# Patient Record
Sex: FEMALE | Race: BLACK OR AFRICAN AMERICAN | NOT HISPANIC OR LATINO | URBAN - METROPOLITAN AREA
[De-identification: names, ages, dates, MRNs, and addresses within clinical notes are randomized per-mention and may not be internally consistent; named-entity substitution may affect disease eponyms.]

---

## 2018-01-28 ENCOUNTER — INPATIENT (INPATIENT)
Facility: HOSPITAL | Age: 63
LOS: 2 days | Discharge: HOME | End: 2018-01-31
Attending: INTERNAL MEDICINE | Admitting: INTERNAL MEDICINE
Payer: MEDICAID

## 2018-01-28 VITALS
TEMPERATURE: 99 F | WEIGHT: 162.04 LBS | DIASTOLIC BLOOD PRESSURE: 68 MMHG | SYSTOLIC BLOOD PRESSURE: 105 MMHG | RESPIRATION RATE: 16 BRPM | HEIGHT: 64 IN | HEART RATE: 68 BPM | OXYGEN SATURATION: 100 %

## 2018-01-28 DIAGNOSIS — E78.00 PURE HYPERCHOLESTEROLEMIA, UNSPECIFIED: ICD-10-CM

## 2018-01-28 DIAGNOSIS — F31.9 BIPOLAR DISORDER, UNSPECIFIED: ICD-10-CM

## 2018-01-28 DIAGNOSIS — I10 ESSENTIAL (PRIMARY) HYPERTENSION: ICD-10-CM

## 2018-01-28 DIAGNOSIS — J10.1 INFLUENZA DUE TO OTHER IDENTIFIED INFLUENZA VIRUS WITH OTHER RESPIRATORY MANIFESTATIONS: ICD-10-CM

## 2018-01-28 DIAGNOSIS — R55 SYNCOPE AND COLLAPSE: ICD-10-CM

## 2018-01-28 LAB
ALBUMIN SERPL ELPH-MCNC: 3.4 G/DL — SIGNIFICANT CHANGE UP (ref 3.3–5)
ALP SERPL-CCNC: 102 U/L — SIGNIFICANT CHANGE UP (ref 40–120)
ALT FLD-CCNC: 21 U/L — SIGNIFICANT CHANGE UP (ref 12–78)
ANION GAP SERPL CALC-SCNC: 8 MMOL/L — SIGNIFICANT CHANGE UP (ref 5–17)
AST SERPL-CCNC: 26 U/L — SIGNIFICANT CHANGE UP (ref 15–37)
BASOPHILS # BLD AUTO: 0 K/UL — SIGNIFICANT CHANGE UP (ref 0–0.2)
BASOPHILS NFR BLD AUTO: 0 % — SIGNIFICANT CHANGE UP (ref 0–2)
BILIRUB SERPL-MCNC: 0.2 MG/DL — SIGNIFICANT CHANGE UP (ref 0.2–1.2)
BUN SERPL-MCNC: 17 MG/DL — SIGNIFICANT CHANGE UP (ref 7–23)
CALCIUM SERPL-MCNC: 8.6 MG/DL — SIGNIFICANT CHANGE UP (ref 8.5–10.1)
CHLORIDE SERPL-SCNC: 98 MMOL/L — SIGNIFICANT CHANGE UP (ref 96–108)
CK MB CFR SERPL CALC: <0.5 NG/ML — SIGNIFICANT CHANGE UP (ref 0.5–3.6)
CO2 SERPL-SCNC: 31 MMOL/L — SIGNIFICANT CHANGE UP (ref 22–31)
CREAT SERPL-MCNC: 1.07 MG/DL — SIGNIFICANT CHANGE UP (ref 0.5–1.3)
EOSINOPHIL # BLD AUTO: 0 K/UL — SIGNIFICANT CHANGE UP (ref 0–0.5)
EOSINOPHIL NFR BLD AUTO: 0 % — SIGNIFICANT CHANGE UP (ref 0–6)
FLUAV SPEC QL CULT: POSITIVE
FLUBV AG SPEC QL IA: NEGATIVE — SIGNIFICANT CHANGE UP
GLUCOSE SERPL-MCNC: 113 MG/DL — HIGH (ref 70–99)
HCT VFR BLD CALC: 33.3 % — LOW (ref 34.5–45)
HGB BLD-MCNC: 11 G/DL — LOW (ref 11.5–15.5)
IMM GRANULOCYTES NFR BLD AUTO: 0.2 % — SIGNIFICANT CHANGE UP (ref 0–1.5)
LACTATE SERPL-SCNC: 1.3 MMOL/L — SIGNIFICANT CHANGE UP (ref 0.7–2)
LYMPHOCYTES # BLD AUTO: 0.67 K/UL — LOW (ref 1–3.3)
LYMPHOCYTES # BLD AUTO: 13.6 % — SIGNIFICANT CHANGE UP (ref 13–44)
MAGNESIUM SERPL-MCNC: 2.5 MG/DL — SIGNIFICANT CHANGE UP (ref 1.6–2.6)
MCHC RBC-ENTMCNC: 28.9 PG — SIGNIFICANT CHANGE UP (ref 27–34)
MCHC RBC-ENTMCNC: 33 GM/DL — SIGNIFICANT CHANGE UP (ref 32–36)
MCV RBC AUTO: 87.6 FL — SIGNIFICANT CHANGE UP (ref 80–100)
MONOCYTES # BLD AUTO: 0.52 K/UL — SIGNIFICANT CHANGE UP (ref 0–0.9)
MONOCYTES NFR BLD AUTO: 10.5 % — SIGNIFICANT CHANGE UP (ref 2–14)
NEUTROPHILS # BLD AUTO: 3.73 K/UL — SIGNIFICANT CHANGE UP (ref 1.8–7.4)
NEUTROPHILS NFR BLD AUTO: 75.7 % — SIGNIFICANT CHANGE UP (ref 43–77)
NRBC # BLD: 0 /100 WBCS — SIGNIFICANT CHANGE UP (ref 0–0)
PLATELET # BLD AUTO: 179 K/UL — SIGNIFICANT CHANGE UP (ref 150–400)
POTASSIUM SERPL-MCNC: 3.5 MMOL/L — SIGNIFICANT CHANGE UP (ref 3.5–5.3)
POTASSIUM SERPL-SCNC: 3.5 MMOL/L — SIGNIFICANT CHANGE UP (ref 3.5–5.3)
PROT SERPL-MCNC: 8.2 GM/DL — SIGNIFICANT CHANGE UP (ref 6–8.3)
RBC # BLD: 3.8 M/UL — SIGNIFICANT CHANGE UP (ref 3.8–5.2)
RBC # FLD: 12.7 % — SIGNIFICANT CHANGE UP (ref 10.3–14.5)
SODIUM SERPL-SCNC: 137 MMOL/L — SIGNIFICANT CHANGE UP (ref 135–145)
TROPONIN I SERPL-MCNC: <.015 NG/ML — SIGNIFICANT CHANGE UP (ref 0.01–0.04)
WBC # BLD: 4.93 K/UL — SIGNIFICANT CHANGE UP (ref 3.8–10.5)
WBC # FLD AUTO: 4.93 K/UL — SIGNIFICANT CHANGE UP (ref 3.8–10.5)

## 2018-01-28 PROCEDURE — 93010 ELECTROCARDIOGRAM REPORT: CPT

## 2018-01-28 PROCEDURE — 70450 CT HEAD/BRAIN W/O DYE: CPT | Mod: 26

## 2018-01-28 PROCEDURE — 71045 X-RAY EXAM CHEST 1 VIEW: CPT | Mod: 26

## 2018-01-28 PROCEDURE — 99285 EMERGENCY DEPT VISIT HI MDM: CPT

## 2018-01-28 RX ORDER — ONDANSETRON 8 MG/1
4 TABLET, FILM COATED ORAL EVERY 6 HOURS
Qty: 0 | Refills: 0 | Status: DISCONTINUED | OUTPATIENT
Start: 2018-01-28 | End: 2018-01-31

## 2018-01-28 RX ORDER — TRIFLUOPERAZINE HCL 5 MG
1 TABLET ORAL
Qty: 0 | Refills: 0 | COMMUNITY

## 2018-01-28 RX ORDER — ATENOLOL 25 MG/1
1 TABLET ORAL
Qty: 0 | Refills: 0 | COMMUNITY

## 2018-01-28 RX ORDER — CARBAMAZEPINE 200 MG
1 TABLET ORAL
Qty: 0 | Refills: 0 | COMMUNITY

## 2018-01-28 RX ORDER — ONDANSETRON 8 MG/1
4 TABLET, FILM COATED ORAL EVERY 6 HOURS
Qty: 0 | Refills: 0 | Status: DISCONTINUED | OUTPATIENT
Start: 2018-01-28 | End: 2018-01-28

## 2018-01-28 RX ORDER — DIPHENHYDRAMINE HCL 50 MG
25 CAPSULE ORAL ONCE
Qty: 0 | Refills: 0 | Status: COMPLETED | OUTPATIENT
Start: 2018-01-28 | End: 2018-01-28

## 2018-01-28 RX ORDER — CARBAMAZEPINE 200 MG
200 TABLET ORAL
Qty: 0 | Refills: 0 | Status: DISCONTINUED | OUTPATIENT
Start: 2018-01-28 | End: 2018-01-31

## 2018-01-28 RX ORDER — SIMVASTATIN 20 MG/1
1 TABLET, FILM COATED ORAL
Qty: 0 | Refills: 0 | COMMUNITY

## 2018-01-28 RX ORDER — ACETAMINOPHEN 500 MG
975 TABLET ORAL ONCE
Qty: 0 | Refills: 0 | Status: COMPLETED | OUTPATIENT
Start: 2018-01-28 | End: 2018-01-28

## 2018-01-28 RX ORDER — BENDROFLUMETHIAZIDE
0 POWDER (GRAM) MISCELLANEOUS
Qty: 0 | Refills: 0 | COMMUNITY

## 2018-01-28 RX ORDER — SIMVASTATIN 20 MG/1
40 TABLET, FILM COATED ORAL AT BEDTIME
Qty: 0 | Refills: 0 | Status: DISCONTINUED | OUTPATIENT
Start: 2018-01-28 | End: 2018-01-31

## 2018-01-28 RX ORDER — SODIUM CHLORIDE 9 MG/ML
2000 INJECTION INTRAMUSCULAR; INTRAVENOUS; SUBCUTANEOUS ONCE
Qty: 0 | Refills: 0 | Status: COMPLETED | OUTPATIENT
Start: 2018-01-28 | End: 2018-01-28

## 2018-01-28 RX ORDER — ATENOLOL 25 MG/1
50 TABLET ORAL DAILY
Qty: 0 | Refills: 0 | Status: DISCONTINUED | OUTPATIENT
Start: 2018-01-28 | End: 2018-01-31

## 2018-01-28 RX ORDER — METOCLOPRAMIDE HCL 10 MG
10 TABLET ORAL ONCE
Qty: 0 | Refills: 0 | Status: COMPLETED | OUTPATIENT
Start: 2018-01-28 | End: 2018-01-28

## 2018-01-28 RX ORDER — ENOXAPARIN SODIUM 100 MG/ML
40 INJECTION SUBCUTANEOUS DAILY
Qty: 0 | Refills: 0 | Status: DISCONTINUED | OUTPATIENT
Start: 2018-01-28 | End: 2018-01-31

## 2018-01-28 RX ORDER — SODIUM CHLORIDE 9 MG/ML
1000 INJECTION, SOLUTION INTRAVENOUS
Qty: 0 | Refills: 0 | Status: DISCONTINUED | OUTPATIENT
Start: 2018-01-28 | End: 2018-01-31

## 2018-01-28 RX ADMIN — Medication 10 MILLIGRAM(S): at 17:04

## 2018-01-28 RX ADMIN — SODIUM CHLORIDE 2000 MILLILITER(S): 9 INJECTION INTRAMUSCULAR; INTRAVENOUS; SUBCUTANEOUS at 17:05

## 2018-01-28 RX ADMIN — Medication 975 MILLIGRAM(S): at 17:05

## 2018-01-28 RX ADMIN — Medication 10 MILLIGRAM(S): at 23:21

## 2018-01-28 RX ADMIN — Medication 75 MILLIGRAM(S): at 17:59

## 2018-01-28 RX ADMIN — ATENOLOL 50 MILLIGRAM(S): 25 TABLET ORAL at 23:21

## 2018-01-28 RX ADMIN — Medication 25 MILLIGRAM(S): at 17:05

## 2018-01-28 RX ADMIN — SODIUM CHLORIDE 100 MILLILITER(S): 9 INJECTION, SOLUTION INTRAVENOUS at 23:20

## 2018-01-28 RX ADMIN — ENOXAPARIN SODIUM 40 MILLIGRAM(S): 100 INJECTION SUBCUTANEOUS at 23:20

## 2018-01-28 RX ADMIN — Medication 200 MILLIGRAM(S): at 23:21

## 2018-01-28 RX ADMIN — Medication 75 MILLIGRAM(S): at 23:21

## 2018-01-28 RX ADMIN — SIMVASTATIN 40 MILLIGRAM(S): 20 TABLET, FILM COATED ORAL at 23:22

## 2018-01-28 NOTE — ED PROVIDER NOTE - OBJECTIVE STATEMENT
Pertinent PMH/PSH/FHx/SHx and Review of Systems contained within:  62F hx of bipolar disorder on tegretol (and so perpetually very reserved according to cousin), htn, hld, and frontal headaches pw 3 d ays headache, cough, congestion, and fatigue. patient was in the bathroom today and had a syncopal episode. patient does not recall this. she notes the pain in her head was gradual onset and frontal. no neck pain, photophobia or confusion  Fh and Sh not otherwise contributory  ROS otherwise negative

## 2018-01-28 NOTE — ED PROVIDER NOTE - MEDICAL DECISION MAKING DETAILS
patient pw syncope in the context of viral syndrome like symptoms. will rule out for the flu and give fluids and rule out acs. As interpreted by ED physician, ECG is NSR with normal intervals/axis, no changes in QRS, no ST/T changes. patient pw syncope in the context of viral syndrome like symptoms. will rule out for the flu and give fluids and rule out acs. As interpreted by ED physician, ECG is NSR with normal intervals/axis, no changes in QRS, no ST/T changes. patient likely had syncope from severe dehydration 2ndary to the flu. will start tamiflu and admit to rehydration.

## 2018-01-28 NOTE — ED PROVIDER NOTE - PHYSICAL EXAMINATION
Gen: Alert but very quiet and slow to answer (reportedly her baseline), NAD  Head: NC, AT   Eyes: PERRL, EOMI, normal lids/conjunctiva  ENT: normal hearing, patent oropharynx without erythema/exudate, uvula midline  Neck: supple, no tenderness, Trachea midline  Pulm: Bilateral BS, normal resp effort, no wheeze/stridor/retractions  CV: RRR, no M/R/G, 2+ radial and dp pulses bl, no edema  Abd: soft, NT/ND, +BS, no hepatosplenomegaly  Mskel: extremities x4 with normal ROM and no joint effusions. no ctl spine ttp.   Skin: no rash, no bruising   Neuro: AAOx3, no sensory/motor deficits, CN 2-12 intact

## 2018-01-28 NOTE — H&P ADULT - HISTORY OF PRESENT ILLNESS
patient  reports  fever  cough  vomiting  for  past  2  days  evaluated  in  ER  found  to  have influenza A  admitted  for  furer w/u  and  treatment

## 2018-01-28 NOTE — ED PROVIDER NOTE - CARE PLAN
Principal Discharge DX:	Syncope, unspecified syncope type Principal Discharge DX:	Syncope, unspecified syncope type  Secondary Diagnosis:	Influenza A

## 2018-01-28 NOTE — H&P ADULT - NSHPLABSRESULTS_GEN_ALL_CORE
LABS:                        11.0   4.93  )-----------( 179      ( 28 Jan 2018 17:14 )             33.3     01-28    137  |  98  |  17  ----------------------------<  113<H>  3.5   |  31  |  1.07    Ca    8.6      28 Jan 2018 17:14  Mg     2.5     01-28    TPro  8.2  /  Alb  3.4  /  TBili  0.2  /  DBili  x   /  AinflueST  26  /  ALT  21  /  AlkPhos  102  01-28  Influenza A Rapid Screen: Positive: Interpretation of Influenza virus Type A - Antigen Enzyme Immunoassay:  This test screens for Influenza A.  A negative result does not eliminate  the possibility of infection with influenza A.  Depending upon the type  and adequacy of the specimen collected, the specificity of the assay  ranges from % and the sensitivity from 20-70%. (01.28.18 @ 17:14)

## 2018-01-28 NOTE — H&P ADULT - NSHPPHYSICALEXAM_GEN_ALL_CORE
PHYSICAL EXAM:    GENERAL: NAD, well-groomed, well-developed  HEAD:  Atraumatic, Normocephalic  EYES: EOMI, PERRLA, conjunctiva and sclera clear  ENMT: No tonsillar erythema, exudates, or enlargement; Moist mucous membranes, , No lesions  NECK: Supple, No JVD, Normal thyroid  NERVOUS SYSTEM:  Alert & Oriented X4, ; Motor Strength 5/5 B/L upper and lower extremities; DTRs 2+ intact and symmetric  CHEST/LUNG: Clear  bilaterally; No rales, rhonchi, wheezing, or rubs  HEART: Regular rate and rhythm; No murmurs, rubs, or gallops  ABDOMEN: Soft, Nontender, Nondistended; no  masses Bowel sounds present  EXTREMITIES:  + Peripheral Pulses, No clubbing, cyanosis, or edema  LYMPH: No lymphadenopathy noted   RECTAL: deferred  SKIN  no  reshes

## 2018-01-28 NOTE — ED ADULT NURSE NOTE - OBJECTIVE STATEMENT
patient stated that she passed about 1 hour ago, she was eating, having lunch and felt like she was about to vomit and then she passed out.  As per cousin, patient passed out for about a minute or two.  Patient reports headache, weakness,  3 episodes of vomiting for today, patient also reports abdominal pain, denies diarrhea, last BM today.
26-Aug-2017 00:53

## 2018-01-29 LAB
ALBUMIN SERPL ELPH-MCNC: 3.2 G/DL — LOW (ref 3.3–5)
ALP SERPL-CCNC: 93 U/L — SIGNIFICANT CHANGE UP (ref 40–120)
ALT FLD-CCNC: 21 U/L — SIGNIFICANT CHANGE UP (ref 12–78)
ANION GAP SERPL CALC-SCNC: 10 MMOL/L — SIGNIFICANT CHANGE UP (ref 5–17)
AST SERPL-CCNC: 26 U/L — SIGNIFICANT CHANGE UP (ref 15–37)
BASOPHILS # BLD AUTO: 0.01 K/UL — SIGNIFICANT CHANGE UP (ref 0–0.2)
BASOPHILS NFR BLD AUTO: 0.2 % — SIGNIFICANT CHANGE UP (ref 0–2)
BILIRUB SERPL-MCNC: 0.3 MG/DL — SIGNIFICANT CHANGE UP (ref 0.2–1.2)
BUN SERPL-MCNC: 11 MG/DL — SIGNIFICANT CHANGE UP (ref 7–23)
CALCIUM SERPL-MCNC: 7.9 MG/DL — LOW (ref 8.5–10.1)
CHLORIDE SERPL-SCNC: 97 MMOL/L — SIGNIFICANT CHANGE UP (ref 96–108)
CO2 SERPL-SCNC: 28 MMOL/L — SIGNIFICANT CHANGE UP (ref 22–31)
CREAT SERPL-MCNC: 0.76 MG/DL — SIGNIFICANT CHANGE UP (ref 0.5–1.3)
EOSINOPHIL # BLD AUTO: 0 K/UL — SIGNIFICANT CHANGE UP (ref 0–0.5)
EOSINOPHIL NFR BLD AUTO: 0 % — SIGNIFICANT CHANGE UP (ref 0–6)
FLUAV H3 RNA SPEC QL NAA+PROBE: DETECTED
GLUCOSE SERPL-MCNC: 112 MG/DL — HIGH (ref 70–99)
HCT VFR BLD CALC: 31.7 % — LOW (ref 34.5–45)
HCV AB S/CO SERPL IA: 0.16 S/CO — SIGNIFICANT CHANGE UP
HCV AB SERPL-IMP: SIGNIFICANT CHANGE UP
HGB BLD-MCNC: 10.6 G/DL — LOW (ref 11.5–15.5)
HIV 1+2 AB+HIV1 P24 AG SERPL QL IA: SIGNIFICANT CHANGE UP
IMM GRANULOCYTES NFR BLD AUTO: 0.2 % — SIGNIFICANT CHANGE UP (ref 0–1.5)
LYMPHOCYTES # BLD AUTO: 1.13 K/UL — SIGNIFICANT CHANGE UP (ref 1–3.3)
LYMPHOCYTES # BLD AUTO: 23 % — SIGNIFICANT CHANGE UP (ref 13–44)
MCHC RBC-ENTMCNC: 29.1 PG — SIGNIFICANT CHANGE UP (ref 27–34)
MCHC RBC-ENTMCNC: 33.4 GM/DL — SIGNIFICANT CHANGE UP (ref 32–36)
MCV RBC AUTO: 87.1 FL — SIGNIFICANT CHANGE UP (ref 80–100)
MONOCYTES # BLD AUTO: 0.37 K/UL — SIGNIFICANT CHANGE UP (ref 0–0.9)
MONOCYTES NFR BLD AUTO: 7.5 % — SIGNIFICANT CHANGE UP (ref 2–14)
NEUTROPHILS # BLD AUTO: 3.4 K/UL — SIGNIFICANT CHANGE UP (ref 1.8–7.4)
NEUTROPHILS NFR BLD AUTO: 69.1 % — SIGNIFICANT CHANGE UP (ref 43–77)
PLATELET # BLD AUTO: 160 K/UL — SIGNIFICANT CHANGE UP (ref 150–400)
POTASSIUM SERPL-MCNC: 3.4 MMOL/L — LOW (ref 3.5–5.3)
POTASSIUM SERPL-SCNC: 3.4 MMOL/L — LOW (ref 3.5–5.3)
PROT SERPL-MCNC: 7.7 GM/DL — SIGNIFICANT CHANGE UP (ref 6–8.3)
RAPID RVP RESULT: DETECTED
RBC # BLD: 3.64 M/UL — LOW (ref 3.8–5.2)
RBC # FLD: 12.4 % — SIGNIFICANT CHANGE UP (ref 10.3–14.5)
SODIUM SERPL-SCNC: 135 MMOL/L — SIGNIFICANT CHANGE UP (ref 135–145)
WBC # BLD: 4.92 K/UL — SIGNIFICANT CHANGE UP (ref 3.8–10.5)
WBC # FLD AUTO: 4.92 K/UL — SIGNIFICANT CHANGE UP (ref 3.8–10.5)

## 2018-01-29 RX ORDER — ACETAMINOPHEN 500 MG
650 TABLET ORAL EVERY 6 HOURS
Qty: 0 | Refills: 0 | Status: DISCONTINUED | OUTPATIENT
Start: 2018-01-29 | End: 2018-01-31

## 2018-01-29 RX ORDER — INFLUENZA VIRUS VACCINE 15; 15; 15; 15 UG/.5ML; UG/.5ML; UG/.5ML; UG/.5ML
0.5 SUSPENSION INTRAMUSCULAR ONCE
Qty: 0 | Refills: 0 | Status: COMPLETED | OUTPATIENT
Start: 2018-01-29 | End: 2018-01-31

## 2018-01-29 RX ADMIN — Medication 75 MILLIGRAM(S): at 17:02

## 2018-01-29 RX ADMIN — Medication 200 MILLIGRAM(S): at 17:01

## 2018-01-29 RX ADMIN — ATENOLOL 50 MILLIGRAM(S): 25 TABLET ORAL at 05:28

## 2018-01-29 RX ADMIN — SODIUM CHLORIDE 100 MILLILITER(S): 9 INJECTION, SOLUTION INTRAVENOUS at 22:17

## 2018-01-29 RX ADMIN — Medication 10 MILLIGRAM(S): at 05:27

## 2018-01-29 RX ADMIN — Medication 650 MILLIGRAM(S): at 02:12

## 2018-01-29 RX ADMIN — SIMVASTATIN 40 MILLIGRAM(S): 20 TABLET, FILM COATED ORAL at 22:17

## 2018-01-29 RX ADMIN — Medication 200 MILLIGRAM(S): at 05:27

## 2018-01-29 RX ADMIN — SODIUM CHLORIDE 100 MILLILITER(S): 9 INJECTION, SOLUTION INTRAVENOUS at 09:43

## 2018-01-29 RX ADMIN — ENOXAPARIN SODIUM 40 MILLIGRAM(S): 100 INJECTION SUBCUTANEOUS at 12:18

## 2018-01-29 RX ADMIN — Medication 75 MILLIGRAM(S): at 05:27

## 2018-01-29 NOTE — ED ADULT NURSE REASSESSMENT NOTE - NS ED NURSE REASSESS COMMENT FT1
received ot from night RN alert and oreinted x4 denies any pain at present admited awaiting for bed, fluid infusing at 100 m/l via 20 gauge left a/c patent pt ambulate with steady.

## 2018-01-29 NOTE — ED ADULT NURSE REASSESSMENT NOTE - NS ED NURSE REASSESS COMMENT FT1
patient noted with fever- Dr guan paged and gave a telephone order to give 650mg tylenol every 6hours PRN for temp greater that 100.4F-RB carried out

## 2018-01-30 RX ADMIN — Medication 75 MILLIGRAM(S): at 05:57

## 2018-01-30 RX ADMIN — Medication 200 MILLIGRAM(S): at 17:23

## 2018-01-30 RX ADMIN — SIMVASTATIN 40 MILLIGRAM(S): 20 TABLET, FILM COATED ORAL at 21:42

## 2018-01-30 RX ADMIN — SODIUM CHLORIDE 100 MILLILITER(S): 9 INJECTION, SOLUTION INTRAVENOUS at 21:42

## 2018-01-30 RX ADMIN — Medication 75 MILLIGRAM(S): at 17:24

## 2018-01-30 RX ADMIN — Medication 200 MILLIGRAM(S): at 05:57

## 2018-01-30 RX ADMIN — ATENOLOL 50 MILLIGRAM(S): 25 TABLET ORAL at 05:58

## 2018-01-30 RX ADMIN — ENOXAPARIN SODIUM 40 MILLIGRAM(S): 100 INJECTION SUBCUTANEOUS at 11:49

## 2018-01-30 RX ADMIN — Medication 10 MILLIGRAM(S): at 05:57

## 2018-01-31 VITALS
DIASTOLIC BLOOD PRESSURE: 78 MMHG | RESPIRATION RATE: 16 BRPM | HEART RATE: 67 BPM | OXYGEN SATURATION: 97 % | TEMPERATURE: 98 F | SYSTOLIC BLOOD PRESSURE: 141 MMHG

## 2018-01-31 LAB
ALBUMIN SERPL ELPH-MCNC: 3.4 G/DL — SIGNIFICANT CHANGE UP (ref 3.3–5)
ALP SERPL-CCNC: 99 U/L — SIGNIFICANT CHANGE UP (ref 40–120)
ALT FLD-CCNC: 32 U/L — SIGNIFICANT CHANGE UP (ref 12–78)
ANION GAP SERPL CALC-SCNC: 5 MMOL/L — SIGNIFICANT CHANGE UP (ref 5–17)
AST SERPL-CCNC: 37 U/L — SIGNIFICANT CHANGE UP (ref 15–37)
BILIRUB SERPL-MCNC: 0.3 MG/DL — SIGNIFICANT CHANGE UP (ref 0.2–1.2)
BUN SERPL-MCNC: 11 MG/DL — SIGNIFICANT CHANGE UP (ref 7–23)
CALCIUM SERPL-MCNC: 8.3 MG/DL — LOW (ref 8.5–10.1)
CHLORIDE SERPL-SCNC: 106 MMOL/L — SIGNIFICANT CHANGE UP (ref 96–108)
CO2 SERPL-SCNC: 29 MMOL/L — SIGNIFICANT CHANGE UP (ref 22–31)
CREAT SERPL-MCNC: 0.78 MG/DL — SIGNIFICANT CHANGE UP (ref 0.5–1.3)
GLUCOSE SERPL-MCNC: 85 MG/DL — SIGNIFICANT CHANGE UP (ref 70–99)
HCT VFR BLD CALC: 36.8 % — SIGNIFICANT CHANGE UP (ref 34.5–45)
HGB BLD-MCNC: 11.9 G/DL — SIGNIFICANT CHANGE UP (ref 11.5–15.5)
MCHC RBC-ENTMCNC: 28.7 PG — SIGNIFICANT CHANGE UP (ref 27–34)
MCHC RBC-ENTMCNC: 32.3 GM/DL — SIGNIFICANT CHANGE UP (ref 32–36)
MCV RBC AUTO: 88.7 FL — SIGNIFICANT CHANGE UP (ref 80–100)
NRBC # BLD: 0 /100 WBCS — SIGNIFICANT CHANGE UP (ref 0–0)
PLATELET # BLD AUTO: 187 K/UL — SIGNIFICANT CHANGE UP (ref 150–400)
POTASSIUM SERPL-MCNC: 3.8 MMOL/L — SIGNIFICANT CHANGE UP (ref 3.5–5.3)
POTASSIUM SERPL-SCNC: 3.8 MMOL/L — SIGNIFICANT CHANGE UP (ref 3.5–5.3)
PROT SERPL-MCNC: 8.4 GM/DL — HIGH (ref 6–8.3)
RBC # BLD: 4.15 M/UL — SIGNIFICANT CHANGE UP (ref 3.8–5.2)
RBC # FLD: 12.5 % — SIGNIFICANT CHANGE UP (ref 10.3–14.5)
SODIUM SERPL-SCNC: 140 MMOL/L — SIGNIFICANT CHANGE UP (ref 135–145)
WBC # BLD: 4.12 K/UL — SIGNIFICANT CHANGE UP (ref 3.8–10.5)
WBC # FLD AUTO: 4.12 K/UL — SIGNIFICANT CHANGE UP (ref 3.8–10.5)

## 2018-01-31 PROCEDURE — 93306 TTE W/DOPPLER COMPLETE: CPT | Mod: 26

## 2018-01-31 RX ORDER — ACETAMINOPHEN 500 MG
2 TABLET ORAL
Qty: 0 | Refills: 0 | COMMUNITY
Start: 2018-01-31

## 2018-01-31 RX ADMIN — ATENOLOL 50 MILLIGRAM(S): 25 TABLET ORAL at 05:17

## 2018-01-31 RX ADMIN — Medication 10 MILLIGRAM(S): at 05:17

## 2018-01-31 RX ADMIN — Medication 200 MILLIGRAM(S): at 06:30

## 2018-01-31 RX ADMIN — Medication 75 MILLIGRAM(S): at 05:16

## 2018-01-31 RX ADMIN — Medication 200 MILLIGRAM(S): at 17:23

## 2018-01-31 RX ADMIN — Medication 75 MILLIGRAM(S): at 17:23

## 2018-01-31 RX ADMIN — INFLUENZA VIRUS VACCINE 0.5 MILLILITER(S): 15; 15; 15; 15 SUSPENSION INTRAMUSCULAR at 17:31

## 2018-01-31 NOTE — DISCHARGE NOTE ADULT - HOSPITAL COURSE
patient  moniotred  on  telemetry  treated  with  IVF  Tamiflu  for  Influenza  A  clinically  improved  no  CP  no  SOB  no  palpitations  No dizziness appetitte  good  ambulationfreely  discharged  home  to  finish  course  of  tamiflu  to  continue  all  previous  medications  to  follow  with PMD  to  call me  if  any  further  questions

## 2018-01-31 NOTE — DISCHARGE NOTE ADULT - CARE PROVIDERS DIRECT ADDRESSES
,DirectAddress_Unknown,[email or web address]

## 2018-01-31 NOTE — DISCHARGE NOTE ADULT - MEDICATION SUMMARY - MEDICATIONS TO TAKE
I will START or STAY ON the medications listed below when I get home from the hospital:    acetaminophen 325 mg oral tablet  -- 2 tab(s) by mouth every 6 hours, As needed, For Temp greater than 38 C (100.4 F)  -- Indication: For pain    enalapril 10 mg oral tablet  -- 1 tab(s) by mouth once a day  -- Indication: For Htn    TEGretol 200 mg oral tablet  -- 1 tab(s) by mouth 2 times a day  -- Indication: For Bipolar 1 disorder    simvastatin 40 mg oral tablet  -- 1 tab(s) by mouth once a day (at bedtime)  -- Indication: For High cholesterol    Stelazine 1 mg oral tablet  -- 1 tab(s) by mouth 2 times a day  -- Indication: For Bipolar 1 disorder    oseltamivir 75 mg oral capsule  -- 1 cap(s) by mouth 2 times a day  -- Indication: For Influenza A    atenolol 50 mg oral tablet  -- 1 tab(s) by mouth once a day  -- Indication: For Hypertension    bendroflumethiazide 5 mg oral tablet  -- 1/2 tab once a day  -- Indication: For Hypertension

## 2018-01-31 NOTE — PROGRESS NOTE ADULT - SUBJECTIVE AND OBJECTIVE BOX
INTERVAL HPI/OVERNIGHT EVENTS:        REVIEW OF SYSTEMS:  CONSTITUTIONAL:  c/o  fatigue    NECK: No pain or stiffnes  RESPIRATORY: No SOB   CARDIOVASCULAR: No chest pain, palpitations, dizziness,   GASTROINTESTINAL: No abdominal pain. No nausea, vomiting,   NEUROLOGICAL: No headaches, no  blurry  vision no  dizziness  SKIN: No itching,   MUSCULOSKELETAL: No pain    MEDICATION:  acetaminophen   Tablet 650 milliGRAM(s) Oral every 6 hours PRN  ATENolol  Tablet 50 milliGRAM(s) Oral daily  carBAMazepine 200 milliGRAM(s) Oral two times a day  dextrose 5%. 1000 milliLiter(s) IV Continuous <Continuous>  enalapril 10 milliGRAM(s) Oral daily  enoxaparin Injectable 40 milliGRAM(s) SubCutaneous daily  influenza   Vaccine 0.5 milliLiter(s) IntraMuscular once  ondansetron Injectable 4 milliGRAM(s) IV Push every 6 hours PRN  oseltamivir 75 milliGRAM(s) Oral two times a day  simvastatin 40 milliGRAM(s) Oral at bedtime    Vital Signs Last 24 Hrs  T(C): 36.7 (31 Jan 2018 04:53), Max: 37.2 (30 Jan 2018 17:42)  T(F): 98.1 (31 Jan 2018 04:53), Max: 98.9 (30 Jan 2018 17:42)  HR: 65 (31 Jan 2018 07:03) (58 - 67)  BP: 150/84 (31 Jan 2018 06:32) (126/74 - 174/89)  BP(mean): --  RR: 17 (31 Jan 2018 04:53) (17 - 18)  SpO2: 100% (31 Jan 2018 04:53) (100% - 100%)    PHYSICAL EXAM:  GENERAL: NAD, well-groomed, well-developed  EYES:  conjunctiva and sclera clear  ENMT:  Moist mucous membranes,   NECK: Supple, No JVD, Normal thyroid  NERVOUS SYSTEM:  Alert oriented   no  focal  deficits;   CHEST/LUNG: Clear    HEART: Regular rate and rhythm; No murmurs, rubs, or gallops  ABDOMEN: Soft, Nontender, Nondistended; Bowel sounds present  EXTREMITIES:  no  edema no  tenderness  SKIN: No rashes   LABS:                        11.9   4.12  )-----------( 187      ( 31 Jan 2018 08:11 )             36.8     01-31    140  |  106  |  11  ----------------------------<  85  3.8   |  29  |  0.78    Ca    8.3<L>      31 Jan 2018 08:11    TPro  8.4<H>  /  Alb  3.4  /  TBili  0.3  /  DBili  x   /  AST  37  /  ALT  32  /  AlkPhos  99  01-31        CAPILLARY BLOOD GLUCOSE          RADIOLOGY & ADDITIONAL TESTS:  Assessment and Plan:   Problem/Plan - 1:  ·  Problem: Influenza A.  Plan: ivf  tamiflu finish tamiflu  as out pt    Problem/Plan - 2:  ·  Problem: Hypertension.  Plan: enalapril  atenolol.     Problem/Plan - 3:  ·  Problem: Syncope, probable  vasovagal  secondary  to  influenza infection no  furtherw/u  as  in  patient  at  this  time    Problem/Plan - 4:  ·  Problem: High cholesterol.  Plan: zocor.     Problem/Plan - 5:  ·  Problem: Bipolar 1 disorder.  Plan:  continue  stellazine  as  oupt stellazine    discharge  home  to  finish  5 day  course  of  Tamiflu
INTERVAL HPI/OVERNIGHT EVENTS:        REVIEW OF SYSTEMS:  CONSTITUTIONAL: feels well presents   no  complaints appetite  good  ambulating freely    NECK: No pain or stiffnes  RESPIRATORY: No SOB   CARDIOVASCULAR: No chest pain, palpitations, dizziness,   GASTROINTESTINAL: No abdominal pain. No nausea, vomiting,   NEUROLOGICAL: No headaches, no  blurry  vision no  dizziness  SKIN: No itching,   MUSCULOSKELETAL: No pain    MEDICATION:  acetaminophen   Tablet 650 milliGRAM(s) Oral every 6 hours PRN  ATENolol  Tablet 50 milliGRAM(s) Oral daily  carBAMazepine 200 milliGRAM(s) Oral two times a day  dextrose 5%. 1000 milliLiter(s) IV Continuous <Continuous>  enalapril 10 milliGRAM(s) Oral daily  enoxaparin Injectable 40 milliGRAM(s) SubCutaneous daily  influenza   Vaccine 0.5 milliLiter(s) IntraMuscular once  ondansetron Injectable 4 milliGRAM(s) IV Push every 6 hours PRN  oseltamivir 75 milliGRAM(s) Oral two times a day  simvastatin 40 milliGRAM(s) Oral at bedtime    Vital Signs Last 24 Hrs  T(C): 36.4 (30 Jan 2018 10:53), Max: 37.3 (29 Jan 2018 14:32)  T(F): 97.6 (30 Jan 2018 10:53), Max: 99.2 (29 Jan 2018 14:32)  HR: 66 (30 Jan 2018 10:53) (61 - 82)  BP: 126/74 (30 Jan 2018 10:53) (126/74 - 152/86)  BP(mean): --  RR: 18 (30 Jan 2018 10:53) (17 - 18)  SpO2: 100% (30 Jan 2018 10:53) (98% - 100%)    PHYSICAL EXAM:  GENERAL: NAD, well-groomed, well-developed  EYES:  conjunctiva and sclera clear  ENMT:  Moist mucous membranes,   NECK: Supple, No JVD, Normal thyroid  NERVOUS SYSTEM:  Alert oriented   no  focal  deficits;   CHEST/LUNG: Clear    HEART: Regular rate and rhythm; No murmurs, rubs, or gallops  ABDOMEN: Soft, Nontender, Nondistended; Bowel sounds present  EXTREMITIES:  no  edema no  tenderness  SKIN: No rashes   LABS:                        10.6   4.92  )-----------( 160      ( 29 Jan 2018 04:39 )             31.7     01-29    135  |  97  |  11  ----------------------------<  112<H>  3.4<L>   |  28  |  0.76    Ca    7.9<L>      29 Jan 2018 04:39  Mg     2.5     01-28    TPro  7.7  /  Alb  3.2<L>  /  TBili  0.3  /  DBili  x   /  AST  26  /  ALT  21  /  AlkPhos  93  01-29        CAPILLARY BLOOD GLUCOSE          RADIOLOGY & ADDITIONAL TESTS:    Imaging reports  Personally Reviewed:  [ x] YES  [ ] NO    Consultant(s) Notes Reviewed:  [ ] YES  [ ] NO    Care Discussed with Consultants/Other Providers [x ] YES  [ ] NO  Assessment and Plan:   Problem/Plan - 1:  ·  Problem: Influenza A.  Plan: ivf  tamiflu zofran.     Problem/Plan - 2:  ·  Problem: Hypertension.  Plan: enalapril  atenolol.     Problem/Plan - 3:  ·  Problem: Syncope, unspecified syncope type.  for  TTE    Problem/Plan - 4:  ·  Problem: High cholesterol.  Plan: zocor.     Problem/Plan - 5:  ·  Problem: Bipolar 1 disorder.  Plan:  continue off stellazine

## 2018-01-31 NOTE — DISCHARGE NOTE ADULT - CARE PROVIDER_API CALL
PMD,   Phone: (   )    -  Fax: (   )    -    Mariano Roth), [facility] [place] [address]  Phone: [phone]  Fax: [phone]

## 2018-02-02 DIAGNOSIS — E86.0 DEHYDRATION: ICD-10-CM

## 2018-02-02 DIAGNOSIS — F31.9 BIPOLAR DISORDER, UNSPECIFIED: ICD-10-CM

## 2018-02-02 DIAGNOSIS — J10.89 INFLUENZA DUE TO OTHER IDENTIFIED INFLUENZA VIRUS WITH OTHER MANIFESTATIONS: ICD-10-CM

## 2018-02-02 DIAGNOSIS — I10 ESSENTIAL (PRIMARY) HYPERTENSION: ICD-10-CM

## 2018-02-02 DIAGNOSIS — R55 SYNCOPE AND COLLAPSE: ICD-10-CM

## 2018-02-02 DIAGNOSIS — E78.5 HYPERLIPIDEMIA, UNSPECIFIED: ICD-10-CM

## 2018-02-02 LAB
CULTURE RESULTS: SIGNIFICANT CHANGE UP
CULTURE RESULTS: SIGNIFICANT CHANGE UP
SPECIMEN SOURCE: SIGNIFICANT CHANGE UP
SPECIMEN SOURCE: SIGNIFICANT CHANGE UP

## 2018-02-05 LAB — TRIFLUOPERAZINE [MASS/VOLUME] IN SERUM OR PLASMA: 0.7 NG/ML — LOW (ref 4–40)

## 2019-05-10 NOTE — DISCHARGE NOTE ADULT - CARE PLAN
Principal Discharge DX:	Influenza A  Goal:	avoidance  of  recurrence  Assessment and plan of treatment:	finish  course  of  Tamiflu  Secondary Diagnosis:	Syncope, unspecified syncope type  Secondary Diagnosis:	Bipolar 1 disorder  Secondary Diagnosis:	Hypertension  Secondary Diagnosis:	High cholesterol
Detail Level: Detailed

## 2020-07-29 NOTE — DISCHARGE NOTE ADULT - WITHDRAWAL SYMPTOMS INCLUDE; NEGATIVE MOOD, URGES TO SMOKE, AND DIFFICULTY CONCENTRATING.
CHIEF COMPLAINT:   No chief complaint on file.       HISTORY OF THE PRESENT ILLNESS:   Patient presents with several week history of intermittent cough, chest pain, shortness of breath, headache, numbness tingling in upper and lower extremities, and questionable fever.  She was tested for COVID in March which was negative she was tested for COVID on Friday July 24th which was negative.  At this time she is more concerned about sore throat and difficulty breathing with dyspnea.    Family History   Problem Relation Age of Onset   • Hyperlipidemia Father    • Allergic Rhinitis Father    • Asthma Father    • NEGATIVE FAMILY HX OF Other         no ov breast or  colon        Social History     Tobacco Use   • Smoking status: Never Smoker   • Smokeless tobacco: Never Used   Substance Use Topics   • Alcohol use: Yes     Alcohol/week: 3.0 standard drinks     Types: 3 Cans of beer per week     Frequency: 2-3 times a week   • Drug use: No       Past Surgical History:   Procedure Laterality Date   • Colonoscopy diagnostic  12/19/2016    Dr. Melgar, Hemorrhoids, F/U Age 51   • Colposcopy bx cervix endocerv curr  6/24/05    Colposcopy - Dr. Dominique   • Esophageal motility study  01/21/2020    Dr. Melgar, ineffective esophageal motility, findings can be seen with underlying reflux disease.   • Esophagogastroduodenoscopy transoral flex w/bx single or mult  2/13/98    EGD with Bx/Dr. Rivas/mild distal esophagitis   • Esophagogastroduodenoscopy transoral flex w/bx single or mult  9/16/11    Dr. Melgar, Esophagitis/Gastritis/GERD/Neg Hpylori   • Esophagogastroduodenoscopy transoral flex w/bx single or mult  7/31/13    Dr. Melgar, Gastritis/Hx of Gastroparesis/Neg Hpylori   • Esophagogastroduodenoscopy transoral flex w/bx single or mult  12/19/2016    Dr. Melgar, Esophagitis/Gastritis/Neg Hpylori   • Esophagogastroduodenoscopy transoral flex w/bx single or mult  10/28/2019    Dr. Melgar, Esophagitis/Hiatal Hernia/Abnormal Bravo pH        Past  Medical History:   Diagnosis Date   • Autonomic neuropathy 2017   • Chronic migraine without aura, not intractable 3/26/2019   • Dysplasia of cervix (uteri) 03 , 2/10    leep 2/10   • Esophageal reflux     occational   • Gastroparesis 2011   • Headache(784.0)     resolved   • Meningitis 11/2017   • Other acne     improved   • PONV (postoperative nausea and vomiting)    • Small fiber neuropathy 2015   • Urticaria        Allergies as of 07/29/2020 - Reviewed 07/29/2020   Allergen Reaction Noted   • Gamma globulin [immune globulin] HEADACHES 11/17/2017   • Gabapentin Other (See Comments) 11/04/2018   • Lyrica Other (See Comments) 11/04/2018       Current Outpatient Medications   Medication   • sumatriptan (IMITREX) 100 MG tablet   • metoPROLOL succinate (TOPROL-XL) 25 MG 24 hr tablet   • naltrexone 3 mg capsule   • pantoprazole (PROTONIX) 40 MG tablet   • DULoxetine (CYMBALTA) 60 MG capsule   • topiramate (TOPAMAX) 50 MG tablet   • albuterol 108 (90 Base) MCG/ACT inhaler   • acetaminophen (TYLENOL) 325 MG tablet   • pyridostigmine (MESTINON) 60 MG tablet   • Cholecalciferol (VITAMIN D3 GUMMIES ADULT) 25 mcg (1,000 units) chew tab   • ondansetron (ZOFRAN) 4 MG tablet   • sucralfate (CARAFATE) 1 g tablet   • ferrous sulfate 325 (65 FE) MG tablet   • fexofenadine (ALLEGRA) 180 MG tablet   • Alpha-Lipoic Acid 200 MG Tab   • Multiple Vitamins-Minerals (MULTIVITAMIN PO)   • Probiotic Product (PROBIOTIC DAILY) CAPS     No current facility-administered medications for this visit.      Facility-Administered Medications Ordered in Other Visits   Medication   • sodium chloride 0.9 % flush bag 250 mL   • sodium chloride (PF) 0.9 % injection 2 mL   • gadobutrol (GADAVIST) injection 7.5 mL       REVIEW OF SYSTEMS:  See HISTORY OF PRESENT ILLNESS.    PHYSICAL EXAMINATION:   GENERAL: The patient is a 39 year old female in no acute distress.   VITAL SIGNS: Blood pressure 128/70, pulse 60, temperature 98.2 °F (36.8 °C), temperature  source Tympanic, resp. rate 20, weight 76.2 kg, last menstrual period 05/08/2020, SpO2 98 %.    HEENT, both TMs are clear, throat is clear, there is no neck adenopathy present.  Lungs reveal some scattered rhonchi with few rales in both bases    Chest x-ray and rapid strep were reviewed, patient will be empirically started on Zithromax 500 mg daily for 5 days       1. Sore throat    2. Dyspnea and respiratory abnormality          Orders Placed This Encounter   • XR Chest PA and Lateral   • Streptococcus group A PCR       Return if symptoms worsen or fail to improve.      Anil Mason Jr, M.D.      Statement Selected

## 2023-03-10 NOTE — ED CLERICAL - DIVISION
[facility]... March 10, 2023     [address] [facility] [facility] [address]   [address] [id number]    Patient: Sheryl Mcmahon   YOB: 1989   Date of Visit: 3/10/2023       Dear Dr Magnus Villalobos: Thank you for referring Sheryl Mcmahon to me for evaluation  Below are my notes for this consultation  If you have questions, please do not hesitate to call me  I look forward to following your patient along with you  Sincerely,        Andrea Cage MD        CC: No Recipients  Andrea Cage MD  3/10/2023  4:54 PM  Sign when Signing Visit  NST is reactive  Glucose flowsheet recorded till 3/7/23 shows some fasting blood sugars above 90 and some 2-hour postprandials are also elevated  She was increased to Lantus 38 units on 3/7/23  Intermittent mildly elevated blood pressures are noted  Agree with diagnosis of gestational hypertension  Blood work today shows no evidence for preeclampsia including a PC ratio of 0 21, uric acid 3 1, normal creatinine 0 45 for pregnancy and normal liver function tests  Plts counts not ordered that I can see  Would continue twice weekly NST and weekly MELIDA till delivery  Delivery can be offered anytime after 37 weeks based on diagnosis of gestational hypertension  Recommend weekly preeclamptic labs ordered through her OB office      Andrea Cage MD